# Patient Record
Sex: MALE | URBAN - METROPOLITAN AREA
[De-identification: names, ages, dates, MRNs, and addresses within clinical notes are randomized per-mention and may not be internally consistent; named-entity substitution may affect disease eponyms.]

---

## 2021-04-19 ENCOUNTER — OFFICE VISIT (OUTPATIENT)
Dept: URBAN - METROPOLITAN AREA CLINIC 94 | Facility: CLINIC | Age: 31
End: 2021-04-19
Payer: SELF-PAY

## 2021-04-19 ENCOUNTER — DASHBOARD ENCOUNTERS (OUTPATIENT)
Age: 31
End: 2021-04-19

## 2021-04-19 DIAGNOSIS — R11.2 NAUSEA AND VOMITING, INTRACTABILITY OF VOMITING NOT SPECIFIED, UNSPECIFIED VOMITING TYPE: ICD-10-CM

## 2021-04-19 PROCEDURE — 99203 OFFICE O/P NEW LOW 30 MIN: CPT | Performed by: INTERNAL MEDICINE

## 2021-04-19 NOTE — HPI-TODAY'S VISIT:
29 y/o male recently seen at [facility] ER for evaluation of consistent emesis x1 week. Pt reports that his sx are worse at night time and that he is normal in the day. He states he usually awakens in the night vomiting and the next morning notes dark stools.  He sometimes has "a little soreness" in the epigastrium. Denies back pain, fever, SOB. No treatments for alleviation of symptoms were noted at this time. He also admits that he has not seen his PCP because he doesn't have one. Pt is a frequent Marijuana smoker and heavy drinker. Pt reports that he drinks 6 packs of alcohol everyday and has been a drinker for 8 years. CBC in ER WNL. Pt started prevacid and zofran. Symptoms improved since then.

## 2021-05-14 ENCOUNTER — OFFICE VISIT (OUTPATIENT)
Dept: URBAN - METROPOLITAN AREA SURGERY CENTER 17 | Facility: SURGERY CENTER | Age: 31
End: 2021-05-14

## 2021-06-07 ENCOUNTER — OFFICE VISIT (OUTPATIENT)
Dept: URBAN - METROPOLITAN AREA CLINIC 94 | Facility: CLINIC | Age: 31
End: 2021-06-07